# Patient Record
Sex: FEMALE | Race: WHITE | NOT HISPANIC OR LATINO | URBAN - METROPOLITAN AREA
[De-identification: names, ages, dates, MRNs, and addresses within clinical notes are randomized per-mention and may not be internally consistent; named-entity substitution may affect disease eponyms.]

---

## 2018-12-10 ENCOUNTER — EMERGENCY (EMERGENCY)
Facility: HOSPITAL | Age: 78
LOS: 0 days | Discharge: HOME | End: 2018-12-10
Attending: EMERGENCY MEDICINE | Admitting: EMERGENCY MEDICINE

## 2018-12-10 VITALS
HEART RATE: 89 BPM | RESPIRATION RATE: 18 BRPM | OXYGEN SATURATION: 98 % | SYSTOLIC BLOOD PRESSURE: 130 MMHG | DIASTOLIC BLOOD PRESSURE: 76 MMHG | TEMPERATURE: 98 F

## 2018-12-10 VITALS
TEMPERATURE: 98 F | WEIGHT: 130.07 LBS | HEIGHT: 58 IN | OXYGEN SATURATION: 96 % | RESPIRATION RATE: 20 BRPM | DIASTOLIC BLOOD PRESSURE: 100 MMHG | HEART RATE: 60 BPM | SYSTOLIC BLOOD PRESSURE: 205 MMHG

## 2018-12-10 DIAGNOSIS — T84.021A DISLOCATION OF INTERNAL LEFT HIP PROSTHESIS, INITIAL ENCOUNTER: ICD-10-CM

## 2018-12-10 DIAGNOSIS — M25.562 PAIN IN LEFT KNEE: ICD-10-CM

## 2018-12-10 DIAGNOSIS — M25.561 PAIN IN RIGHT KNEE: ICD-10-CM

## 2018-12-10 DIAGNOSIS — Z96.653 PRESENCE OF ARTIFICIAL KNEE JOINT, BILATERAL: Chronic | ICD-10-CM

## 2018-12-10 DIAGNOSIS — M25.551 PAIN IN RIGHT HIP: ICD-10-CM
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
## 2018-12-10 DIAGNOSIS — M79.651 PAIN IN RIGHT THIGH: ICD-10-CM
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
## 2018-12-10 DIAGNOSIS — Z96.642 PRESENCE OF LEFT ARTIFICIAL HIP JOINT: Chronic | ICD-10-CM

## 2018-12-10 DIAGNOSIS — Z96.642 PRESENCE OF LEFT ARTIFICIAL HIP JOINT: ICD-10-CM

## 2018-12-10 DIAGNOSIS — M79.652 PAIN IN LEFT THIGH: ICD-10-CM
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
## 2018-12-10 LAB
ALBUMIN SERPL ELPH-MCNC: 4 G/DL — SIGNIFICANT CHANGE UP (ref 3.5–5.2)
ALP SERPL-CCNC: 60 U/L — SIGNIFICANT CHANGE UP (ref 30–115)
ALT FLD-CCNC: 17 U/L — SIGNIFICANT CHANGE UP (ref 0–41)
ANION GAP SERPL CALC-SCNC: 18 MMOL/L — HIGH (ref 7–14)
APTT BLD: 27.5 SEC — SIGNIFICANT CHANGE UP (ref 27–39.2)
AST SERPL-CCNC: 32 U/L — SIGNIFICANT CHANGE UP (ref 0–41)
BASOPHILS # BLD AUTO: 0.02 K/UL — SIGNIFICANT CHANGE UP (ref 0–0.2)
BASOPHILS NFR BLD AUTO: 0.3 % — SIGNIFICANT CHANGE UP (ref 0–1)
BILIRUB SERPL-MCNC: 0.7 MG/DL — SIGNIFICANT CHANGE UP (ref 0.2–1.2)
BLD GP AB SCN SERPL QL: SIGNIFICANT CHANGE UP
BUN SERPL-MCNC: 17 MG/DL — SIGNIFICANT CHANGE UP (ref 10–20)
CALCIUM SERPL-MCNC: 9.7 MG/DL — SIGNIFICANT CHANGE UP (ref 8.5–10.1)
CHLORIDE SERPL-SCNC: 102 MMOL/L — SIGNIFICANT CHANGE UP (ref 98–110)
CO2 SERPL-SCNC: 24 MMOL/L — SIGNIFICANT CHANGE UP (ref 17–32)
CREAT SERPL-MCNC: 1.1 MG/DL — SIGNIFICANT CHANGE UP (ref 0.7–1.5)
EOSINOPHIL # BLD AUTO: 0.09 K/UL — SIGNIFICANT CHANGE UP (ref 0–0.7)
EOSINOPHIL NFR BLD AUTO: 1.5 % — SIGNIFICANT CHANGE UP (ref 0–8)
GLUCOSE SERPL-MCNC: 94 MG/DL — SIGNIFICANT CHANGE UP (ref 70–99)
HCT VFR BLD CALC: 34.4 % — LOW (ref 37–47)
HGB BLD-MCNC: 11.3 G/DL — LOW (ref 12–16)
IMM GRANULOCYTES NFR BLD AUTO: 1.3 % — HIGH (ref 0.1–0.3)
INR BLD: 0.96 RATIO — SIGNIFICANT CHANGE UP (ref 0.65–1.3)
LYMPHOCYTES # BLD AUTO: 0.82 K/UL — LOW (ref 1.2–3.4)
LYMPHOCYTES # BLD AUTO: 13.2 % — LOW (ref 20.5–51.1)
MCHC RBC-ENTMCNC: 27.6 PG — SIGNIFICANT CHANGE UP (ref 27–31)
MCHC RBC-ENTMCNC: 32.8 G/DL — SIGNIFICANT CHANGE UP (ref 32–37)
MCV RBC AUTO: 84.1 FL — SIGNIFICANT CHANGE UP (ref 81–99)
MONOCYTES # BLD AUTO: 0.63 K/UL — HIGH (ref 0.1–0.6)
MONOCYTES NFR BLD AUTO: 10.2 % — HIGH (ref 1.7–9.3)
NEUTROPHILS # BLD AUTO: 4.56 K/UL — SIGNIFICANT CHANGE UP (ref 1.4–6.5)
NEUTROPHILS NFR BLD AUTO: 73.5 % — SIGNIFICANT CHANGE UP (ref 42.2–75.2)
NRBC # BLD: 0 /100 WBCS — SIGNIFICANT CHANGE UP (ref 0–0)
PLATELET # BLD AUTO: 280 K/UL — SIGNIFICANT CHANGE UP (ref 130–400)
POTASSIUM SERPL-MCNC: 3.9 MMOL/L — SIGNIFICANT CHANGE UP (ref 3.5–5)
POTASSIUM SERPL-SCNC: 3.9 MMOL/L — SIGNIFICANT CHANGE UP (ref 3.5–5)
PROT SERPL-MCNC: 6.9 G/DL — SIGNIFICANT CHANGE UP (ref 6–8)
PROTHROM AB SERPL-ACNC: 11.1 SEC — SIGNIFICANT CHANGE UP (ref 9.95–12.87)
RBC # BLD: 4.09 M/UL — LOW (ref 4.2–5.4)
RBC # FLD: 14.3 % — SIGNIFICANT CHANGE UP (ref 11.5–14.5)
SODIUM SERPL-SCNC: 144 MMOL/L — SIGNIFICANT CHANGE UP (ref 135–146)
TYPE + AB SCN PNL BLD: SIGNIFICANT CHANGE UP
WBC # BLD: 6.2 K/UL — SIGNIFICANT CHANGE UP (ref 4.8–10.8)
WBC # FLD AUTO: 6.2 K/UL — SIGNIFICANT CHANGE UP (ref 4.8–10.8)

## 2018-12-10 RX ORDER — HYDROMORPHONE HYDROCHLORIDE 2 MG/ML
1 INJECTION INTRAMUSCULAR; INTRAVENOUS; SUBCUTANEOUS ONCE
Qty: 0 | Refills: 0 | Status: DISCONTINUED | OUTPATIENT
Start: 2018-12-10 | End: 2018-12-10

## 2018-12-10 RX ORDER — PROPOFOL 10 MG/ML
7 INJECTION, EMULSION INTRAVENOUS ONCE
Qty: 0 | Refills: 0 | Status: DISCONTINUED | OUTPATIENT
Start: 2018-12-10 | End: 2018-12-10

## 2018-12-10 RX ORDER — MORPHINE SULFATE 50 MG/1
4 CAPSULE, EXTENDED RELEASE ORAL ONCE
Qty: 0 | Refills: 0 | Status: DISCONTINUED | OUTPATIENT
Start: 2018-12-10 | End: 2018-12-10

## 2018-12-10 RX ADMIN — HYDROMORPHONE HYDROCHLORIDE 1 MILLIGRAM(S): 2 INJECTION INTRAMUSCULAR; INTRAVENOUS; SUBCUTANEOUS at 12:02

## 2018-12-10 RX ADMIN — HYDROMORPHONE HYDROCHLORIDE 1 MILLIGRAM(S): 2 INJECTION INTRAMUSCULAR; INTRAVENOUS; SUBCUTANEOUS at 13:00

## 2018-12-10 RX ADMIN — PROPOFOL 7 MILLIGRAM(S): 10 INJECTION, EMULSION INTRAVENOUS at 14:16

## 2018-12-10 RX ADMIN — MORPHINE SULFATE 4 MILLIGRAM(S): 50 CAPSULE, EXTENDED RELEASE ORAL at 11:31

## 2018-12-10 NOTE — ED PROVIDER NOTE - NSFOLLOWUPINSTRUCTIONS_ED_ALL_ED_FT
Please follow up at [facility] for orthopedics evaluation.    Patient to be discharged from ED. Any available test results were discussed with patient and/or family. Verbal instructions given, including instructions to return to ED immediately for any new, worsening, or concerning symptoms. Patient endorsed understanding. Written discharge instructions additionally given, including follow-up plan.

## 2018-12-10 NOTE — ED ADULT NURSE NOTE - NSIMPLEMENTINTERV_GEN_ALL_ED
Implemented All Fall with Harm Risk Interventions:  [place] to call system. Call bell, personal items and telephone within reach. Instruct patient to call for assistance. Room bathroom lighting operational. Non-slip footwear when patient is off stretcher. Physically safe environment: no spills, clutter or unnecessary equipment. Stretcher in lowest position, wheels locked, appropriate side rails in place. Provide visual cue, wrist band, yellow gown, etc. Monitor gait and stability. Monitor for mental status changes and reorient to person, place, and time. Review medications for side effects contributing to fall risk. Reinforce activity limits and safety measures with patient and family. Provide visual clues: red socks.

## 2018-12-10 NOTE — ED PROVIDER NOTE - PHYSICAL EXAMINATION
Vital Signs: I have reviewed the initial vital signs.  Constitutional: NAD, well-nourished, appears stated age, no acute distress.  HEENT: Airway patent, moist MM, no erythema/swelling/deformity of oral structures. EOMI, PERRLA. No C-spine midline tenderness, full ROM of neck without pain. No signs of head trauma or facial trauma.  CV: regular rate, regular rhythm, well-perfused extremities, 2+ b/l DP and radial pulses equal.  Lungs: BCTA, no increased WOB.  ABD: NTND, no guarding or rebound, no pulsatile mass, no hernias.   MSK: Neck supple, nontender, nl ROM, no stepoff. Chest nontender. Back nontender in TLS spine or to b/l bony structures or flanks. Full ROM of upper extremities without tenderness. (+) Bilateral hip tenderness to palpation, primarily over left hip with limited ROM. (+) Bilateral femur tenderness and bilateral knee tenderness with limited ROM due to pain. Full ankle ROM bilaterally without pain.   INTEG: Skin warm, dry, no rash.  NEURO: A&Ox3, CN II-XII intact, normal strength 5/5 all 4 ext, nl sensation throughout, normal speech and coordination.  PSYCH: Calm, cooperative, normal affect and interaction.

## 2018-12-10 NOTE — CONSULT NOTE ADULT - ASSESSMENT
s/p left thr dislocation s/p closed reduction     pain control  knee immobilizer all times  wbat   needs fu with surgeon this week

## 2018-12-10 NOTE — ED PROVIDER NOTE - MEDICAL DECISION MAKING DETAILS
79 Y/O F S/P THR ONE WEEK AGO AT [facility] PRESENTED WITH L HIP PAIN S/P FALL. PT WITH L HIP DISLOCATION. HIP REDUCED SUCCESSFULLY BY ORTHO WITH MODERATE SEDATION AND PT DISCHARGED HOME TO FOLLOW UP WITH HER ORTHOPEDIST AT [facility].

## 2018-12-10 NOTE — ED PROVIDER NOTE - PROGRESS NOTE DETAILS
Patient seen and evaluated by me. Labs/imaging ordered. Given 4mg morphine IV for pain. Patient seen and evaluated by me. Labs/imaging ordered. Given 4mg morphine IV for pain. WIll consult ortho/trauma PRN findings on imaging. Patient likely will require admission given inability to ambulate at her baseline since the fall. Noted pelvic Xray - spoke with ortho - they are coming to see patient. Patient upgraded to crit for procedural sedation. PT SIGNED OUT TO ME BY DR. IVORY, PT WILL REQUIRE MODERATE SEDATION FOR REDUCTION. Noted pelvic Xray - spoke with ortho - they are coming to see patient. Patient upgraded to crit for procedural sedation. Patient originally in too much pain to complete femur and knee xrays - given 1mg dilaudid IV and will re-attempt prior to reduction. reduction successful, f/u ortho at [facility] as outpatient. patient ambulatory, pain improved PT AWAKE AND ALERT. PT DRANK WATER. KNEE IMMOBILIZER IN PLACE. ORTHO RECOMMENDED FOLLOW UP WITH ORTHOPEDIST AT [facility]. PT GIVEN MODERATE SEDATION D/C INSTRUCTIONS AND DISCUSSED VERBALLY WITH PT AND FAMILY AT BEDSIDE.

## 2018-12-10 NOTE — ED ADULT TRIAGE NOTE - CHIEF COMPLAINT QUOTE
s/p fall at home, patient c/o left leg pain. patient s/p left hip replacement x 1 week ago. patient denies any LOC or anticoagulant use

## 2018-12-10 NOTE — CONSULT NOTE ADULT - SUBJECTIVE AND OBJECTIVE BOX
Orthopaedic Surgery Consult Note    For Surgeon:    HPI:  78yFemale  Patient is a 78y old  Female who presents with a chief complaint of s/p fall presents with left thr dislocation.  Pt is s/p left thr 1 week ago at [facility]. Pt reports falling today and having left hip pain and knee pain  HPI:      Allergies    No Known Allergies    Intolerances      PAST MEDICAL & SURGICAL HISTORY:  No pertinent past medical history  History of left hip replacement  History of bilateral knee replacement    MEDICATIONS  (STANDING):    MEDICATIONS  (PRN):      Vital Signs Last 24 Hrs  T(C): 36.4 (10 Dec 2018 10:55), Max: 36.4 (10 Dec 2018 10:55)  T(F): 97.6 (10 Dec 2018 10:55), Max: 97.6 (10 Dec 2018 10:55)  HR: 87 (10 Dec 2018 14:26) (60 - 96)  BP: 130/62 (10 Dec 2018 14:26) (130/62 - 205/100)  BP(mean): --  RR: 18 (10 Dec 2018 14:26) (17 - 22)  SpO2: 99% (10 Dec 2018 14:26) (94% - 99%)    Physical Exam:  left hip: dressing aquacell in place c/d/i   leg is externally rotated and abducted   nvid   strong pulses and sensation intact                           11.3   6.20  )-----------( 280      ( 10 Dec 2018 11:25 )             34.4     12-10    144  |  102  |  17  ----------------------------<  94  3.9   |  24  |  1.1    Ca    9.7      10 Dec 2018 11:25    TPro  6.9  /  Alb  4.0  /  TBili  0.7  /  DBili  x   /  AST  32  /  ALT  17  /  AlkPhos  60  12-10    PT/INR - ( 10 Dec 2018 11:25 )   PT: 11.10 sec;   INR: 0.96 ratio         PTT - ( 10 Dec 2018 11:25 )  PTT:27.5 sec  Imaging:  pelvis: s/p left thr anterior inferior obturator dislocation   knee: s/p l tkr no fx seen   femur: no fx seen      Procedure note: by ed staff pt given sedation   pt underwent successful closed reduction of hip  [id number] with xrays post reduction   pt placed in knee immobilizer   tolerated procedure well

## 2018-12-10 NOTE — ED PROVIDER NOTE - OBJECTIVE STATEMENT
78 year old female, pmhx bilateral knee replacement 10 years ago, as well as left hip replacement 1 week ago at [facility], presenting s/p mechanical fall at home. Patient states she normally walks with a walker and was transferring from her walker to balancing while holding her kitchen counter at which time she lost her balance and fell on her bilateral knees. Since falling, patient states she is having bilateral hip pain, bilateral knee pain and bilateral femur pain. Otherwise denies head trauma, LOC, or any other bodily trauma or pain. Patient not on AC and does not take any medications regularly except for pain medication s/p the hip replacement last week. Patient is reliable historian and remembers the whole event. States she was on the floor for 1 hour prior to her aide coming by the house and finding her on the floor.

## 2022-02-13 NOTE — ED PROCEDURE NOTE - NS_POSTPROCCAREGUIDE_ED_ALL_ED
Never smoker
Patient is now fully awake, with vital signs and temperature stable, hydration is adequate, patients Kimberly’s  score is at baseline (or greater than 8), patient and escort has received  discharge education.

## 2024-10-31 ENCOUNTER — NON-APPOINTMENT (OUTPATIENT)
Age: 84
End: 2024-10-31

## 2024-10-31 ENCOUNTER — APPOINTMENT (OUTPATIENT)
Dept: OTOLARYNGOLOGY | Facility: CLINIC | Age: 84
End: 2024-10-31
Payer: MEDICARE

## 2024-10-31 VITALS — WEIGHT: 138 LBS | HEIGHT: 58 IN | BODY MASS INDEX: 28.97 KG/M2

## 2024-10-31 DIAGNOSIS — R05.3 CHRONIC COUGH: ICD-10-CM

## 2024-10-31 DIAGNOSIS — Z80.9 FAMILY HISTORY OF MALIGNANT NEOPLASM, UNSPECIFIED: ICD-10-CM

## 2024-10-31 DIAGNOSIS — Z82.49 FAMILY HISTORY OF ISCHEMIC HEART DISEASE AND OTHER DISEASES OF THE CIRCULATORY SYSTEM: ICD-10-CM

## 2024-10-31 DIAGNOSIS — Z87.39 PERSONAL HISTORY OF OTHER DISEASES OF THE MUSCULOSKELETAL SYSTEM AND CONNECTIVE TISSUE: ICD-10-CM

## 2024-10-31 DIAGNOSIS — Z78.9 OTHER SPECIFIED HEALTH STATUS: ICD-10-CM

## 2024-10-31 DIAGNOSIS — Z86.79 PERSONAL HISTORY OF OTHER DISEASES OF THE CIRCULATORY SYSTEM: ICD-10-CM

## 2024-10-31 DIAGNOSIS — Z87.891 PERSONAL HISTORY OF NICOTINE DEPENDENCE: ICD-10-CM

## 2024-10-31 DIAGNOSIS — K21.9 GASTRO-ESOPHAGEAL REFLUX DISEASE W/OUT ESOPHAGITIS: ICD-10-CM

## 2024-10-31 DIAGNOSIS — Z83.3 FAMILY HISTORY OF DIABETES MELLITUS: ICD-10-CM

## 2024-10-31 DIAGNOSIS — Z86.39 PERSONAL HISTORY OF OTHER ENDOCRINE, NUTRITIONAL AND METABOLIC DISEASE: ICD-10-CM

## 2024-10-31 PROBLEM — Z00.00 ENCOUNTER FOR PREVENTIVE HEALTH EXAMINATION: Status: ACTIVE | Noted: 2024-10-31

## 2024-10-31 PROCEDURE — 99203 OFFICE O/P NEW LOW 30 MIN: CPT | Mod: 25

## 2024-10-31 PROCEDURE — 31575 DIAGNOSTIC LARYNGOSCOPY: CPT

## 2024-10-31 RX ORDER — UBIDECARENONE/VIT E ACET 100MG-5
CAPSULE ORAL
Refills: 0 | Status: ACTIVE | COMMUNITY

## 2024-10-31 RX ORDER — LOSARTAN POTASSIUM 100 MG/1
TABLET, FILM COATED ORAL
Refills: 0 | Status: ACTIVE | COMMUNITY

## 2024-10-31 RX ORDER — FAMOTIDINE 40 MG/1
40 TABLET, FILM COATED ORAL
Qty: 30 | Refills: 3 | Status: ACTIVE | COMMUNITY
Start: 2024-10-31 | End: 1900-01-01

## 2024-10-31 RX ORDER — MULTIVIT-MIN/IRON/FOLIC ACID/K 18-600-40
CAPSULE ORAL
Refills: 0 | Status: ACTIVE | COMMUNITY

## 2024-10-31 RX ORDER — ERGOCALCIFEROL 1.25 MG/1
CAPSULE ORAL
Refills: 0 | Status: ACTIVE | COMMUNITY

## 2024-10-31 RX ORDER — CELECOXIB 50 MG/1
CAPSULE ORAL
Refills: 0 | Status: ACTIVE | COMMUNITY

## 2024-10-31 RX ORDER — VITAMIN A 10000 UNIT
TABLET ORAL
Refills: 0 | Status: ACTIVE | COMMUNITY

## 2024-10-31 RX ORDER — OMEPRAZOLE 20 MG/1
TABLET, DELAYED RELEASE ORAL
Refills: 0 | Status: ACTIVE | COMMUNITY

## 2024-12-12 ENCOUNTER — APPOINTMENT (OUTPATIENT)
Dept: OTOLARYNGOLOGY | Facility: CLINIC | Age: 84
End: 2024-12-12
Payer: MEDICARE

## 2024-12-12 ENCOUNTER — NON-APPOINTMENT (OUTPATIENT)
Age: 84
End: 2024-12-12

## 2024-12-12 DIAGNOSIS — J30.0 VASOMOTOR RHINITIS: ICD-10-CM

## 2024-12-12 DIAGNOSIS — K21.9 GASTRO-ESOPHAGEAL REFLUX DISEASE W/OUT ESOPHAGITIS: ICD-10-CM

## 2024-12-12 PROCEDURE — 99213 OFFICE O/P EST LOW 20 MIN: CPT | Mod: 25

## 2024-12-12 PROCEDURE — 31575 DIAGNOSTIC LARYNGOSCOPY: CPT

## 2024-12-12 RX ORDER — IPRATROPIUM BROMIDE 21 UG/1
0.03 SPRAY NASAL
Qty: 1 | Refills: 2 | Status: ACTIVE | COMMUNITY
Start: 2024-12-12 | End: 1900-01-01

## 2025-02-26 ENCOUNTER — NON-APPOINTMENT (OUTPATIENT)
Age: 85
End: 2025-02-26

## 2025-02-26 DIAGNOSIS — E78.5 HYPERLIPIDEMIA, UNSPECIFIED: ICD-10-CM

## 2025-02-26 DIAGNOSIS — Z78.9 OTHER SPECIFIED HEALTH STATUS: ICD-10-CM

## 2025-02-26 DIAGNOSIS — I10 ESSENTIAL (PRIMARY) HYPERTENSION: ICD-10-CM

## 2025-02-26 DIAGNOSIS — Z87.42 PERSONAL HISTORY OF OTHER DISEASES OF THE FEMALE GENITAL TRACT: ICD-10-CM

## 2025-02-26 DIAGNOSIS — M19.90 UNSPECIFIED OSTEOARTHRITIS, UNSPECIFIED SITE: ICD-10-CM

## 2025-02-26 DIAGNOSIS — Z92.89 PERSONAL HISTORY OF OTHER MEDICAL TREATMENT: ICD-10-CM

## 2025-02-26 RX ORDER — ATORVASTATIN CALCIUM 40 MG/1
40 TABLET, FILM COATED ORAL
Refills: 0 | Status: ACTIVE | COMMUNITY

## 2025-02-26 RX ORDER — NYSTATIN 100000 [USP'U]/G
100000 CREAM TOPICAL 3 TIMES DAILY
Refills: 0 | Status: ACTIVE | COMMUNITY

## 2025-02-26 RX ORDER — CLOBETASOL PROPIONATE CREAM USP, 0.05% 0.5 MG/G
0.05 CREAM TOPICAL
Refills: 0 | Status: ACTIVE | COMMUNITY

## 2025-03-06 ENCOUNTER — RX RENEWAL (OUTPATIENT)
Age: 85
End: 2025-03-06

## 2025-04-14 ENCOUNTER — APPOINTMENT (OUTPATIENT)
Age: 85
End: 2025-04-14
Payer: MEDICARE

## 2025-04-14 DIAGNOSIS — L84 CORNS AND CALLOSITIES: ICD-10-CM

## 2025-04-14 DIAGNOSIS — M79.675 TINEA UNGUIUM: ICD-10-CM

## 2025-04-14 DIAGNOSIS — B35.1 TINEA UNGUIUM: ICD-10-CM

## 2025-04-14 DIAGNOSIS — M79.674 TINEA UNGUIUM: ICD-10-CM

## 2025-04-14 DIAGNOSIS — L60.2 ONYCHOGRYPHOSIS: ICD-10-CM

## 2025-04-14 PROCEDURE — 11721 DEBRIDE NAIL 6 OR MORE: CPT | Mod: 59

## 2025-04-14 PROCEDURE — 17110 DESTRUCTION B9 LES UP TO 14: CPT | Mod: 79

## 2025-04-14 PROCEDURE — 99213 OFFICE O/P EST LOW 20 MIN: CPT | Mod: 25

## 2025-04-14 PROCEDURE — 11055 PARING/CUTG B9 HYPRKER LES 1: CPT

## 2025-06-23 ENCOUNTER — APPOINTMENT (OUTPATIENT)
Age: 85
End: 2025-06-23
Payer: MEDICARE

## 2025-06-23 PROCEDURE — 11721 DEBRIDE NAIL 6 OR MORE: CPT

## 2025-06-23 PROCEDURE — 99213 OFFICE O/P EST LOW 20 MIN: CPT | Mod: 25

## 2025-09-02 ENCOUNTER — APPOINTMENT (OUTPATIENT)
Age: 85
End: 2025-09-02

## 2025-09-09 ENCOUNTER — APPOINTMENT (OUTPATIENT)
Age: 85
End: 2025-09-09
Payer: MEDICARE

## 2025-09-09 DIAGNOSIS — M79.672 PAIN IN LEFT FOOT: ICD-10-CM

## 2025-09-09 DIAGNOSIS — M79.674 TINEA UNGUIUM: ICD-10-CM

## 2025-09-09 DIAGNOSIS — L60.2 ONYCHOGRYPHOSIS: ICD-10-CM

## 2025-09-09 DIAGNOSIS — B35.1 TINEA UNGUIUM: ICD-10-CM

## 2025-09-09 DIAGNOSIS — L84 CORNS AND CALLOSITIES: ICD-10-CM

## 2025-09-09 DIAGNOSIS — M79.671 PAIN IN RIGHT FOOT: ICD-10-CM

## 2025-09-09 DIAGNOSIS — M79.675 TINEA UNGUIUM: ICD-10-CM

## 2025-09-09 PROCEDURE — 99213 OFFICE O/P EST LOW 20 MIN: CPT | Mod: 25

## 2025-09-09 PROCEDURE — 11721 DEBRIDE NAIL 6 OR MORE: CPT | Mod: 59

## 2025-09-09 PROCEDURE — 11056 PARNG/CUTG B9 HYPRKR LES 2-4: CPT
